# Patient Record
(demographics unavailable — no encounter records)

---

## 2017-12-27 NOTE — CARD
--------------- APPROVED REPORT --------------





EKG Measurement

Heart [id number]

AK 156P6

[id number]

[id number]

[id number]



<Conclusion>

Normal sinus rhythm

Left axis deviation

Minimal voltage criteria for LVH, may be normal variant

Inferior infarct, age undetermined

Abnormal ECG

## 2017-12-27 NOTE — CARD
--------------- APPROVED REPORT --------------





EKG Measurement

Heart [id number]

MA [id number]

[id number]

[id number]

[id number]



<Conclusion>

Normal sinus rhythm

Left axis deviation

Moderate voltage criteria for LVH, may be normal variant

Lateral infarct, age undetermined

Inferior infarct, age undetermined

Abnormal ECG

## 2017-12-27 NOTE — CARDCATH
PROCEDURE DATE:  12/27/2017



HISTORY:  The patient is a 47-year-old male who is status post acute

inferior wall myocardial infarction, treated in [place] with a

combination of PTCA and coronary artery bypass surgery.  Multiple efforts

to obtain the medical records from [facility] were

unsuccessful and scanty.  The patient underwent a stress test which showed

apical and inferior ischemic areas.



The patient continues to experience ongoing chest pain.  Because of this, a

cardiac catheterization was recommended.



PROCEDURES:  Left heart catheterization with coronary arteriography, left

ventriculogram, LIMA angiogram, IVUS of the left main artery as well as

PTCA of the distal LAD through the LIMA were performed.  There were no

complications.



The right femoral artery was cannulated with a 6-Tongan sheath.  There were

no complications.



I performed moderate sedation, which included the presence of an

independent trained observer that assisted in monitoring the patient's

level of consciousness and physiologic status.  After administration of

Versed and fentanyl, my intra service time was 30 minutes.



The findings on catheterization revealed left ventricle that revealed a

severely hypokinetic inferoapical region.  The anterior wall contracted

well.  Estimated ejection fraction is approximately 45%.



His coronary anatomy revealed a right dominant circulation.



The RCA revealed patent stents in the proximal and midportion of the RCA

with good flow.



The left main artery revealed ostial stenosis with an IVUS, evaluation of a

60% stenosis in the proximal left main artery stenoses.



The circumflex artery and obtuse marginal branches revealed diffuse intimal

irregularities without critical stenoses.



The LAD revealed a 90% stenosis in the proximal portion as well as a 80%

stenosis at the takeoff of a diagonal vessel.  The rest of the LAD revealed

to-and-fro flow consistent with a bypass graft.



The LIMA was visualized and found to be anastomosed to the distal LAD.



The LIMA was patent.  At the anastomotic site with the coronary arteries,

there was a 99% stenoses in the apex of the LAD just after the anastomotic

site.  There was retrograde flow to the LAD and partially into the diagonal

vessel through the LIMA.



The patient was started on intravenous Angiomax.



IVUS of the left main artery revealed an ostial as well as midbody

eccentric 60% stenosis of the left main artery.



The guiding catheter was placed in the ostium of the LIMA.  An ATW wire and

finally _____ was used to cross the critical lesion in the LAD after the

anastomotic site.  Attempts at using a 2.0 and a 1.5 balloon could not

cross the diffusely diseased and small portion of the apical LAD.



The proximal portion of the lesion was dilated with 1.5 without significant

improvement of the stenosis.



The procedure was aborted.  The manual compression was used to close the

femoral artery site.  The patient tolerated the procedure well.



In summary, the procedure revealed attempted PTCA of the apical critical

lesion of the LAD through the LIMA.  Coronary arteriography revealed a left

main artery of 60% confirmed by IVUS.



There are critical lesions in the proximal LAD as well as the mid LAD at

the takeoff of the diagonal vessel.



The circumflex artery was free of significant disease.



There is patent stents in the RCA.



There is a patent LIMA to the LAD with only retrograde filling of the LAD

and partially down the diagonal vessel.



Given these findings, the patient's treatment will be continued medical

therapy.  The patient is at risk for progressive coronary artery disease. 

He will need to remain on aspirin indefinitely and Brilinta at reduced

doses for at least a year.  He needs to undergo a strict cardiac risk

reduction program.





__________________________________________

Leland Lopez MD



DD:  12/27/2017 9:44:08

DT:  12/27/2017 9:48:33

Job # [id number]

## 2017-12-28 NOTE — CARD
--------------- APPROVED REPORT --------------





EKG Measurement

Heart [id number]

AL 158P5

[id number]

[id number]

[id number]



<Conclusion>

Normal sinus rhythm

Left axis deviation

Minimal voltage criteria for LVH, may be normal variant

Inferior infarct, age undetermined

T wave abnormality, consider anterolateral ischemia

Abnormal ECG

## 2017-12-28 NOTE — PN
DATE:  12/28/2017



SUBJECTIVE:  The patient is chest-pain free, ambulating on the floor

without symptoms.



OBJECTIVE:

VITAL SIGNS:  Blood pressure is 134/89, heart rate is in the 80s.

NECK:  Negative JVD.

LUNGS:  Without rales.

HEART:  Reveal S1, S2.

EXTREMITIES:  Without edema.  The right groin site is stable.



LABORATORY DATA:  Hemoglobin is 14.6.  Chemistries, BUN and creatinine are

unremarkable.



IMPRESSION:

1.  Status post coronary artery bypass surgery.

2.  Status post percutaneous coronary intervention.

3.  History of inferior wall myocardial infarction.

4.  Chest pain which is more likely from his sternotomy.

5.  Hypercholesterolemia.

6.  Hypertension.



Given these findings, I have discussed with the patient a cardiac risk

reduction program.  We will DC telemetry today.



The patient can be discharged.  Followup and instructions were given to the

patient in detail.





__________________________________________

Lealnd Lopez MD





DD:  12/28/2017 9:39:16

DT:  12/28/2017 9:43:09

Job # [id number]